# Patient Record
Sex: FEMALE | Race: BLACK OR AFRICAN AMERICAN | NOT HISPANIC OR LATINO | Employment: FULL TIME | URBAN - METROPOLITAN AREA
[De-identification: names, ages, dates, MRNs, and addresses within clinical notes are randomized per-mention and may not be internally consistent; named-entity substitution may affect disease eponyms.]

---

## 2017-03-30 ENCOUNTER — HOSPITAL ENCOUNTER (EMERGENCY)
Facility: OTHER | Age: 62
Discharge: HOME | End: 2017-03-30
Attending: INTERNAL MEDICINE
Payer: MEDICAID

## 2017-03-30 VITALS
OXYGEN SATURATION: 98 % | WEIGHT: 120 LBS | HEART RATE: 76 BPM | RESPIRATION RATE: 18 BRPM | BODY MASS INDEX: 24.19 KG/M2 | HEIGHT: 59 IN | SYSTOLIC BLOOD PRESSURE: 122 MMHG | TEMPERATURE: 98 F | DIASTOLIC BLOOD PRESSURE: 81 MMHG

## 2017-03-30 DIAGNOSIS — R07.9 CHEST PAIN: ICD-10-CM

## 2017-03-30 DIAGNOSIS — R09.1 PLEURISY: Primary | ICD-10-CM

## 2017-03-30 LAB
ALBUMIN SERPL-MCNC: 3.7 G/DL (ref 3.3–5.5)
ALP SERPL-CCNC: 70 U/L (ref 42–141)
BILIRUB SERPL-MCNC: 0.5 MG/DL (ref 0.2–1.6)
BUN SERPL-MCNC: 21 MG/DL (ref 7–22)
CALCIUM SERPL-MCNC: 9 MG/DL (ref 8–10.3)
CHLORIDE SERPL-SCNC: 103 MMOL/L (ref 98–108)
CREAT SERPL-MCNC: 0.6 MG/DL (ref 0.6–1.2)
GLUCOSE SERPL-MCNC: 140 MG/DL (ref 73–118)
POC ALT (SGPT): 29 U/L (ref 10–47)
POC AST (SGOT): 33 U/L (ref 11–38)
POC B-TYPE NATRIURETIC PEPTIDE: 13.4 PG/ML (ref 0–100)
POC CARDIAC TROPONIN I: 0 NG/ML
POC TCO2: 24 MMOL/L (ref 18–33)
POTASSIUM BLD-SCNC: 4 MMOL/L (ref 3.6–5.1)
PROTEIN, POC: 7.2 G/DL (ref 6.4–8.1)
SAMPLE: NORMAL
SODIUM BLD-SCNC: 139 MMOL/L (ref 128–145)

## 2017-03-30 PROCEDURE — 99284 EMERGENCY DEPT VISIT MOD MDM: CPT

## 2017-03-30 PROCEDURE — 25000003 PHARM REV CODE 250: Performed by: INTERNAL MEDICINE

## 2017-03-30 RX ORDER — IBUPROFEN 800 MG/1
800 TABLET ORAL EVERY 8 HOURS PRN
Qty: 20 TABLET | Refills: 0 | Status: SHIPPED | OUTPATIENT
Start: 2017-03-30 | End: 2017-11-04 | Stop reason: SDUPTHER

## 2017-03-30 RX ORDER — IBUPROFEN 400 MG/1
800 TABLET ORAL
Status: COMPLETED | OUTPATIENT
Start: 2017-03-30 | End: 2017-03-30

## 2017-03-30 RX ORDER — NAPROXEN SODIUM 220 MG/1
162 TABLET, FILM COATED ORAL
Status: DISCONTINUED | OUTPATIENT
Start: 2017-03-30 | End: 2017-03-30 | Stop reason: HOSPADM

## 2017-03-30 RX ADMIN — IBUPROFEN 800 MG: 400 TABLET, FILM COATED ORAL at 09:03

## 2017-03-30 NOTE — DISCHARGE INSTRUCTIONS
Pleurisy  You have pain in your chest. Your healthcare provider has told you that you have pleurisy, or pleuritis. Pleurisy is swelling (inflammation) of the pleura. The pleura are two layers of thin smooth tissue that surround the lungs and line the chest.  What are the symptoms of pleurisy?     Pleurisy is inflammation of the pleura. The pleura cover the lungs and line the chest.   Pleurisy usually causes sharp chest pain. It is usually worse when you take a deep breath, cough, or sneeze.  What causes pleurisy?  Many things can cause pleurisy. A common cause is a viral infection like the flu or pneumonia. Serious lung problems that can cause it include:  · A blood clot in the lung (pulmonary embolism)  · Air between the pleura (pneumothorax)  Serious heart problems that can cause pleurisy include:  · Heart attack  · Inflammation of the covering of the heart (pericarditis)  How is pleurisy diagnosed?  Your healthcare provider examines you and asks you about your symptoms and health history. He or she will first check you for the serious causes of chest pain. You may have:  · Lab tests  · Imaging tests such as chest X-ray, CT scan, or ultrasound  · EKG  How is pleurisy treated?  Treatment depends on what is causing the pleurisy. Serious conditions are treated in the hospital. You may need medicines to decrease the inflammation and pain.     Call 911  Call 911 if any of these occur:  · Trouble breathing  · Chest pain that gets worse  Call your healthcare provider  Call your healthcare provider right away if you have:  · A fever of 100.4°F (38°C) or higher, or as directed by your healthcare provider   Date Last Reviewed: 11/1/2016  © 7288-2784 [employer]. [address] [place] [address], [place] [address]. All rights reserved. This information is not intended as a substitute for professional medical care. Always follow your healthcare professional's instructions.

## 2017-03-30 NOTE — ED AVS SNAPSHOT
[facility] EMERGENCY DEPARTMENT  [address]  [place] [address]               Baljinder Rausch   3/30/2017  8:16 AM   ED    Description:  Female : 1955   Department:  [facility] Emergency Department           Your Care was Coordinated By:     Provider Role From To    Shaheen Bradshaw MD Attending Provider 17 0856 --      Reason for Visit     Chest Pain           Diagnoses this Visit        Comments    Pleurisy    -  Primary     Chest pain           ED Disposition     ED Disposition Condition Comment    Discharge             To Do List           Follow-up Information     Follow up with Primary Doctor No. Schedule an appointment as soon as possible for a visit in 1 day(s).       These Medications        Disp Refills Start End    ibuprofen (ADVIL,MOTRIN) 800 MG tablet 20 tablet 0 3/30/2017     Take 1 tablet (800 mg total) by mouth every 8 (eight) hours as needed for Pain. - Oral    Pharmacy: [facility] [id number]  KATHY MOYA [facility] #: [phone]         [facility]     [facility] Care [facility] -  Assistance  Unless otherwise directed by your provider, please contact [facility], our nurse care line that is available for  assistance.     Registered nurses in the [facility] provide: appointment scheduling, clinical advisement, health education, and other advisory services.  Call: [phone] (toll free)               Medications           Message regarding Medications     Verify the changes and/or additions to your medication regime listed below are the same as discussed with your clinician today.  If any of these changes or additions are incorrect, please notify your healthcare provider.        START taking these NEW medications        Refills    ibuprofen (ADVIL,MOTRIN) 800 MG tablet 0    Sig: Take 1 tablet (800 mg total) by mouth every 8 (eight) hours as needed for Pain.    Class: Normal     "Route: Oral      These medications were administered today        Dose Freq    aspirin chewable tablet 162 mg 162 mg ED 1 Time    Sig: Take 2 tablets (162 mg total) by mouth ED 1 Time.    Class: Normal    Route: Oral    ibuprofen tablet 800 mg 800 mg ED 1 Time    Sig: Take 2 tablets (800 mg total) by mouth ED 1 Time.    Class: Normal    Route: Oral           Verify that the below list of medications is an accurate representation of the medications you are currently taking.  If none reported, the list may be blank. If incorrect, please contact your healthcare provider. Carry this list with you in case of emergency.           Current Medications     aspirin chewable tablet 162 mg Take 2 tablets (162 mg total) by mouth ED 1 Time.    ibuprofen (ADVIL,MOTRIN) 800 MG tablet Take 1 tablet (800 mg total) by mouth every 8 (eight) hours as needed for Pain.    ibuprofen tablet 800 mg Take 2 tablets (800 mg total) by mouth ED 1 Time.           Clinical Reference Information           Your Vitals Were     BP Pulse Temp Resp Height Weight    122/81 76 97.8 °F (36.6 °C) (Temporal) 18 4' 11" (1.499 m) 54.4 kg (120 lb)    SpO2 BMI             98% 24.24 kg/m2         Allergies as of 3/30/2017        Reactions    Pcn [Penicillins]       Immunizations Administered on Date of Encounter - 3/30/2017     None      ED Micro, Lab, POCT     Start Ordered       Status Ordering Provider    03/30/17 0854 03/30/17 0854  POCT B-type natriuretic peptide (BNP)  Once      Final result     03/30/17 0842 03/30/17 0842  Troponin ISTAT  Once      Final result     03/30/17 0837 03/30/17 0837  POCT CMP  Once      Final result     03/30/17 0832 03/30/17 0832  POCT CBC  Once      Final result     03/30/17 0832 03/30/17 0832  POCT CMP  Once      Acknowledged     03/30/17 0832 03/30/17 0832  POCT Troponin  Once      Acknowledged     03/30/17 0832 03/30/17 0832  POCT B-type natriuretic peptide (BNP)  Once      Acknowledged       ED Imaging Orders     Start " Ordered       Status Ordering Provider    03/30/17 0832 03/30/17 0832  X-Ray Chest PA And Lateral  1 time imaging      Final result         Discharge Instructions           Pleurisy  You have pain in your chest. Your healthcare provider has told you that you have pleurisy, or pleuritis. Pleurisy is swelling (inflammation) of the pleura. The pleura are two layers of thin smooth tissue that surround the lungs and line the chest.  What are the symptoms of pleurisy?     Pleurisy is inflammation of the pleura. The pleura cover the lungs and line the chest.   Pleurisy usually causes sharp chest pain. It is usually worse when you take a deep breath, cough, or sneeze.  What causes pleurisy?  Many things can cause pleurisy. A common cause is a viral infection like the flu or pneumonia. Serious lung problems that can cause it include:  · A blood clot in the lung (pulmonary embolism)  · Air between the pleura (pneumothorax)  Serious heart problems that can cause pleurisy include:  · Heart attack  · Inflammation of the covering of the heart (pericarditis)  How is pleurisy diagnosed?  Your healthcare provider examines you and asks you about your symptoms and health history. He or she will first check you for the serious causes of chest pain. You may have:  · Lab tests  · Imaging tests such as chest X-ray, CT scan, or ultrasound  · EKG  How is pleurisy treated?  Treatment depends on what is causing the pleurisy. Serious conditions are treated in the hospital. You may need medicines to decrease the inflammation and pain.     Call 911  Call 911 if any of these occur:  · Trouble breathing  · Chest pain that gets worse  Call your healthcare provider  Call your healthcare provider right away if you have:  · A fever of 100.4°F (38°C) or higher, or as directed by your healthcare provider   Date Last Reviewed: 11/1/2016  © 4181-0437 [employer]. [address] [place] [address], [place] [address]. All rights reserved. This  information is not intended as a substitute for professional medical care. Always follow your healthcare professional's instructions.          [facility] Sign-Up     Activating your [facility] account is as easy as 1-2-3!     1) Visit [email or web address], select Sign Up Now, enter this activation code and your date of birth, then select Next.  [email or web address]  Expires: 5/14/2017  9:45 AM      2) Create a username and password to use when you visit [facility] in the future and select a security question in case you lose your password and select Next.    3) Enter your e-mail address and click Sign Up!    Additional Information  If you have questions, please e-mail [email or web address] or call [phone] to talk to our [facility] staff. Remember, [facility] is NOT to be used for urgent needs. For medical emergencies, dial 911.          [facility] Emergency Department complies with applicable Federal civil rights laws and does not discriminate on the basis of race, color, national origin, age, disability, or sex.        Language Assistance Services     ATTENTION: Language assistance services are available, free of charge. Please call [phone].      ATENCIÓN: Si habla español, tiene a oliver disposición servicios gratuitos de asistencia lingüística. Llame al [phone].     CHÚ Ý: N?u b?n nói Ti?ng Vi?t, có các d?ch v? h? tr? ngôn ng? mi?n phí dành cho b?n. G?i s? [phone].

## 2017-03-30 NOTE — ED PROVIDER NOTES
Encounter Date: 3/30/2017       History     Chief Complaint   Patient presents with    Chest Pain     Pt here with c/o midsternal CP that began this morning along with SOB     Review of patient's allergies indicates:   Allergen Reactions    Pcn [penicillins]      Patient is a 61 y.o. female presenting with the following complaint: chest pain. The history is provided by the patient. No  was used.   Chest Pain   Illness onset: approx 6 hours ago. Chest pain occurs constantly. The chest pain is unchanged. The pain is associated with breathing. At its most intense, the chest pain is at 8/10. The quality of the pain is described as pleuritic and sharp. The pain does not radiate. Chest pain is worsened by deep breathing. Pertinent negatives for primary symptoms include no fever, no fatigue, no syncope, no shortness of breath, no cough, no wheezing, no palpitations, no abdominal pain, no nausea, no vomiting, no dizziness and no altered mental status. She tried nothing for the symptoms.     Past Medical History:   Diagnosis Date    Sciatica      History reviewed. No pertinent surgical history.  History reviewed. No pertinent family history.  Social History   Substance Use Topics    Smoking status: Never Smoker    Smokeless tobacco: None    Alcohol use No     Review of Systems   Constitutional: Negative.  Negative for fatigue and fever.   HENT: Negative.    Eyes: Negative.    Respiratory: Negative for cough, shortness of breath, wheezing and stridor.         Pleuritic pain   Cardiovascular: Positive for chest pain. Negative for palpitations and syncope.   Gastrointestinal: Negative.  Negative for abdominal pain, nausea and vomiting.   Endocrine: Negative.    Musculoskeletal: Negative.    Skin: Negative.    Neurological: Negative for dizziness.   Hematological: Negative.        Physical Exam   Initial Vitals   BP Pulse Resp Temp SpO2   03/30/17 0822 03/30/17 0822 03/30/17 0823 03/30/17 0823 03/30/17  0820   131/106 79 20 97.8 °F (36.6 °C) 98 %     Physical Exam    Nursing note and vitals reviewed.  Constitutional: She appears well-developed and well-nourished.   HENT:   Head: Normocephalic.   Eyes: EOM are normal.   Neck: Normal range of motion.   Cardiovascular: Normal rate and regular rhythm.   Pulmonary/Chest: Breath sounds normal. No respiratory distress. She has no wheezes. She has no rhonchi. She has no rales. She exhibits no tenderness.   Abdominal: Soft. Bowel sounds are normal.   Musculoskeletal: Normal range of motion.   Neurological: She is alert and oriented to person, place, and time.   Skin: Skin is warm and dry.         ED Course   Procedures  Labs Reviewed   TROPONIN ISTAT   POCT CBC   POCT B-TYPE NATRIURETIC PEPTIDE (BNP)   POCT CMP   POCT CMP   POCT TROPONIN   POCT B-TYPE NATRIURETIC PEPTIDE (BNP)     EKG Readings: (Independently Interpreted)   Initial Reading: No STEMI. Rhythm: Normal Sinus Rhythm. Ectopy: No Ectopy. Conduction: Normal. ST Segments: Normal ST Segments. T Waves: Normal. Clinical Impression: Normal Sinus Rhythm                            ED Course     Labs Reviewed  Admission on 03/30/2017   Component Date Value Ref Range Status    POC Cardiac Troponin I 03/30/2017 0.00  <0.09 ng/mL Final    Sample 03/30/2017 UNK   Final    POC B-Type Natriuretic Peptide 03/30/2017 13.4  0.0 - 100 pg/mL Final    Albumin, POC 03/30/2017 3.7  3.3 - 5.5 g/dL Final    Alkaline Phosphatase, POC 03/30/2017 70  42 - 141 U/L Final    ALT (SGPT), POC 03/30/2017 29  10.0 - 47.0 U/L Final    AST (SGOT), POC 03/30/2017 33  11.0 - 38 U/L Final    POC BUN 03/30/2017 21  7.0 - 22.0 mg/dL Final    Calcium, POC 03/30/2017 9.0  8.0 - 10.3 mg/dL Final    POC Chloride 03/30/2017 103  98 - 108 mmol/L Final    POC Creatinine 03/30/2017 0.6  0.6 - 1.2 mg/dL Final    POC Glucose 03/30/2017 140  73 - 118 mg/dL Final    POC Potassium 03/30/2017 4.0  3.6 - 5.1 mmol/L Final    POC Sodium 03/30/2017 139  128 -  145 mmol/L Final    Bilirubin 03/30/2017 0.5  0.2 - 1.6 mg/dL Final    POC TCO2 03/30/2017 24  18 - 33 mmol/L Final    Protein 03/30/2017 7.2  6.4 - 8.1 g/dL Final        Imaging Reviewed    Imaging Results         X-Ray Chest PA And Lateral (Final result) Result time:  03/30/17 08:57:16    Final result by Interface, Rad Results In (03/30/17 08:57:16)    Narrative:    Study Desc:   XR CHEST PA AND LATERAL  Clinical History: Chest pain and shortness of breath     COMPARISON: None     FINDINGS:     The PA and lateral chest radiographs demonstrated increased lung volumes without   interstitial or airspace opacities, pleural effusions or pneumothorax.     The heart size and pulmonary vasculature are normal. The trachea is midline. There are no   clinically significant osseous abnormalities noted.     IMPRESSION:  No chest radiographic evidence of acute cardiopulmonary disease.     SL: 24  Signed by: Mauro Snider MD  2017-03-30 08:57:18              Medications given in ED    Medications   aspirin chewable tablet 162 mg (162 mg Oral Not Given 3/30/17 0845)   ibuprofen tablet 800 mg (not administered)       Discharge Medications               Patient discharged to home in stable condition with instructions to:   1. Please take all meds as prescribed.  2. Follow-up with your primary care doctor   3. Return precautions discussed and patient and/or family/caretaker understands to return to the emergency room for any concerns including worsening of your current symptoms, fever, chills, night sweats, worsening pain, chest pain, shortness of breath, nausea, vomiting, diarrhea, bleeding, headache, difficulty talking, visual disturbances, weakness, numbness or any other acute concerns      Clinical Impression:   Pleurisy  Disposition:   Disposition: Discharged  Condition: Stable       Shaheen Bradshaw MD  03/30/17 0942

## 2017-11-04 ENCOUNTER — HOSPITAL ENCOUNTER (EMERGENCY)
Facility: OTHER | Age: 62
Discharge: HOME | End: 2017-11-04
Attending: INTERNAL MEDICINE
Payer: MEDICAID

## 2017-11-04 VITALS
TEMPERATURE: 98 F | RESPIRATION RATE: 16 BRPM | OXYGEN SATURATION: 99 % | HEART RATE: 66 BPM | BODY MASS INDEX: 25.2 KG/M2 | HEIGHT: 59 IN | WEIGHT: 125 LBS | DIASTOLIC BLOOD PRESSURE: 74 MMHG | SYSTOLIC BLOOD PRESSURE: 113 MMHG

## 2017-11-04 DIAGNOSIS — S39.012A STRAIN OF LUMBAR REGION, INITIAL ENCOUNTER: Primary | ICD-10-CM

## 2017-11-04 PROCEDURE — 99283 EMERGENCY DEPT VISIT LOW MDM: CPT

## 2017-11-04 RX ORDER — METHOCARBAMOL 750 MG/1
1500 TABLET, FILM COATED ORAL 3 TIMES DAILY
Qty: 30 TABLET | Refills: 0 | Status: SHIPPED | OUTPATIENT
Start: 2017-11-04 | End: 2017-11-09

## 2017-11-04 RX ORDER — IBUPROFEN 800 MG/1
800 TABLET ORAL EVERY 8 HOURS PRN
Qty: 30 TABLET | Refills: 0 | OUTPATIENT
Start: 2017-11-04 | End: 2019-09-06
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
## 2019-09-06 ENCOUNTER — HOSPITAL ENCOUNTER (EMERGENCY)
Facility: HOSPITAL | Age: 64
Discharge: HOME | End: 2019-09-06
Attending: INTERNAL MEDICINE
Payer: MEDICAID

## 2019-09-06 VITALS
RESPIRATION RATE: 18 BRPM | TEMPERATURE: 98 F | DIASTOLIC BLOOD PRESSURE: 74 MMHG | BODY MASS INDEX: 25.82 KG/M2 | OXYGEN SATURATION: 100 % | SYSTOLIC BLOOD PRESSURE: 133 MMHG | WEIGHT: 123 LBS | HEART RATE: 76 BPM | HEIGHT: 58 IN

## 2019-09-06 DIAGNOSIS — R13.10 ODYNOPHAGIA: Primary | ICD-10-CM

## 2019-09-06 PROCEDURE — 99284 EMERGENCY DEPT VISIT MOD MDM: CPT | Mod: 25,ER

## 2019-09-06 RX ORDER — IBUPROFEN 800 MG/1
800 TABLET ORAL EVERY 8 HOURS PRN
Qty: 30 TABLET | Refills: 0 | Status: SHIPPED | OUTPATIENT
Start: 2019-09-06

## 2019-09-07 NOTE — ED PROVIDER NOTES
"Encounter Date: 9/6/2019    SCRIBE #1 NOTE: I, Arelis Montanez, am scribing for, and in the presence of,  Dr. Bradshaw. I have scribed the following portions of the note - Other sections scribed: HPI, ROS, PE.       History     Chief Complaint   Patient presents with    Foreign Body In Throat     Patient stated she ate blue crabs on wednesday and complaining she has a piece "stuck" in throat     This is a 63 year old female presenting to ED feeling as if there is a crab shell in her throat x 2 days. Patient reports eating gumbo 2 days ago, which is when she suspects it happened. She denies SOB or trouble swallowing at the current time.     The history is provided by the patient. No  was used.     Review of patient's allergies indicates:   Allergen Reactions    Pcn [penicillins]      Past Medical History:   Diagnosis Date    Sciatica      No past surgical history on file.  No family history on file.  Social History     Tobacco Use    Smoking status: Never Smoker   Substance Use Topics    Alcohol use: No    Drug use: No     Review of Systems   Constitutional: Negative for fever.   HENT:        Foreign body sensation in throat   Respiratory: Negative for shortness of breath.    Musculoskeletal: Negative for neck pain and neck stiffness.   All other systems reviewed and are negative.      Physical Exam     Initial Vitals [09/06/19 1925]   BP Pulse Resp Temp SpO2   137/83 73 20 98.5 °F (36.9 °C) 100 %      MAP       --         Physical Exam    Nursing note and vitals reviewed.  Constitutional: She appears well-developed and well-nourished.   HENT:   Head: Normocephalic and atraumatic.   Right Ear: External ear normal.   Left Ear: External ear normal.   Mouth/Throat: Oropharynx is clear and moist and mucous membranes are normal.   Enlarged nasal turbinates    Eyes: Conjunctivae are normal.   Neck: Neck supple.   Cardiovascular: Normal rate and regular rhythm. Exam reveals no gallop and no " friction rub.    No murmur heard.  Pulmonary/Chest: Breath sounds normal. No respiratory distress.   Musculoskeletal: Normal range of motion.   Neurological: She is alert and oriented to person, place, and time.   Skin: Skin is warm and dry.   Psychiatric: She has a normal mood and affect.         ED Course   Procedures  Labs Reviewed - No data to display       Imaging Results          CT Soft Tissue Neck WO Contrast (Final result)  Result time 09/06/19 20:58:50    Final result by Cedrick Ayers MD (09/06/19 20:58:50)                 Impression:      No evidence of radiopaque foreign body or inflammatory changes in the neck, allowing for significant motion limitations.  Clinical correlation and direct visualization, as clinically warranted.      Electronically signed by: Cedrick Ayers MD  Date:    09/06/2019  Time:    20:58             Narrative:    EXAMINATION:  CT SOFT TISSUE NECK WITHOUT CONTRAST    CLINICAL HISTORY:  Sore throat/stridor, epiglottis or tonsillitis suspected;Rule out foreign body;    TECHNIQUE:  Low dose axial images, sagittal and coronal reformations were performed from the skull base to the level of the clavicles.  Contrast was not administered.    COMPARISON:  No comparison is available.    FINDINGS:  The visualized intracranial compartment is within normal limits.  The orbits and intraorbital contents are within normal limits.  The nasal cavity is within normal limits.    There is significant dental hardware obscuring evaluation of the oral cavity, nasopharynx and oropharynx.  There is also significant motion limitations to the examination.  No radiopaque foreign body is identified.  No inflammatory changes identified in the neck, allowing for motion limitations.    The trachea is unremarkable.  There are fibrotic changes in the visualized lung apices.  There is no evidence of a pneumothorax.  No discrete pulmonary nodules identified.    There are degenerative changes in the osseous structures.   There is no evidence of a fracture.                                 Medical Decision Making:   History:   Old Medical Records: I decided to obtain old medical records.  Initial Assessment:   This is a 63 year old female presenting to ED feeling as if there is a crab shell in her throat x 2 days. Patient reports eating gumbo 2 days ago, which is when she suspects it happened. She denies SOB or trouble swallowing at the current time.   Clinical Tests:   Radiological Study: Ordered and Reviewed  ED Management:  CT of the soft tissues neck reveals no abnormality.  Patient was given instructions for odynophagia and received a prescription for ibuprofen.  She was advised to follow up with her primary care physician within the next 4 days for re-evaluation and to return to the emergency department if condition worsens.            Scribe Attestation:   Scribe #1: I performed the above scribed service and the documentation accurately describes the services I performed. I attest to the accuracy of the note.    This document was produced by a scribe under my direction and in my presence. I agree with the content of the note and have made any necessary edits.     Dr. Bradshaw    09/06/2019 9:21 PM             Clinical Impression:     1. Odynophagia            Disposition:   Disposition: Discharged  Condition: Stable                        Shaheen Bradshaw MD  09/06/19 6190

## 2019-09-07 NOTE — ED TRIAGE NOTES
Pt presents to ER with c/o eating crabs a couple days ago and feels like something is still stuck in her throat from it.  She is swallowing without difficulty and no swelling to throat noted.

## 2021-03-09 ENCOUNTER — HOSPITAL ENCOUNTER (EMERGENCY)
Facility: HOSPITAL | Age: 66
Discharge: HOME | End: 2021-03-09
Attending: INTERNAL MEDICINE
Payer: MEDICAID

## 2021-03-09 VITALS
TEMPERATURE: 99 F | WEIGHT: 131.81 LBS | BODY MASS INDEX: 26.57 KG/M2 | RESPIRATION RATE: 18 BRPM | HEART RATE: 74 BPM | OXYGEN SATURATION: 99 % | HEIGHT: 59 IN | DIASTOLIC BLOOD PRESSURE: 74 MMHG | SYSTOLIC BLOOD PRESSURE: 121 MMHG

## 2021-03-09 DIAGNOSIS — J02.9 PHARYNGITIS, UNSPECIFIED ETIOLOGY: Primary | ICD-10-CM

## 2021-03-09 DIAGNOSIS — T18.9XXA FOREIGN BODY IN DIGESTIVE SYSTEM: ICD-10-CM

## 2021-03-09 PROCEDURE — 99283 EMERGENCY DEPT VISIT LOW MDM: CPT | Mod: 25,ER

## 2024-05-20 ENCOUNTER — OFFICE VISIT (OUTPATIENT)
Dept: URGENT CARE | Facility: CLINIC | Age: 69
End: 2024-05-20
Payer: MEDICARE

## 2024-05-20 VITALS
HEART RATE: 87 BPM | OXYGEN SATURATION: 95 % | HEIGHT: 59 IN | SYSTOLIC BLOOD PRESSURE: 123 MMHG | RESPIRATION RATE: 18 BRPM | TEMPERATURE: 99 F | BODY MASS INDEX: 26.57 KG/M2 | DIASTOLIC BLOOD PRESSURE: 83 MMHG | WEIGHT: 131.81 LBS

## 2024-05-20 DIAGNOSIS — T18.9XXA SWALLOWED FOREIGN BODY, INITIAL ENCOUNTER: Primary | ICD-10-CM

## 2024-05-20 PROCEDURE — 70360 X-RAY EXAM OF NECK: CPT | Mod: S$GLB,,, | Performed by: RADIOLOGY

## 2024-05-20 PROCEDURE — 99203 OFFICE O/P NEW LOW 30 MIN: CPT | Mod: S$GLB,,, | Performed by: NURSE PRACTITIONER

## 2024-05-20 PROCEDURE — 74018 RADEX ABDOMEN 1 VIEW: CPT | Mod: S$GLB,,, | Performed by: RADIOLOGY

## 2024-05-20 RX ORDER — LATANOPROST 50 UG/ML
1 SOLUTION/ DROPS OPHTHALMIC NIGHTLY
COMMUNITY
Start: 2024-02-28

## 2024-05-20 NOTE — PROGRESS NOTES
"Subjective:      Patient ID: Lizeth Rausch is a 68 y.o. female.    Vitals:  height is 4' 11" (1.499 m) and weight is 59.8 kg (131 lb 13.4 oz). Her oral temperature is 99 °F (37.2 °C). Her blood pressure is 123/83 and her pulse is 87. Her respiration is 18 and oxygen saturation is 95%.     Chief Complaint: Swallowed Foreign Body    68-year-old female presents to clinic for evaluation of possible swallowed foreign body.  Patient states that she was eating an ice cream cone yesterday, and noticed that a tooth was missing from her dentures.  She denies any pain.  She reports bowel movement this morning.  Denies blood in stool.  She is awake and alert, behavior appropriate to situation, no acute distress noted on today's visit.    Swallowed Foreign Body  The incident occurred 12 to 24 hours ago. Suspected object: a tooth. The foreign body is suspected to be swallowed. The incident was suspected. The incident was witnessed/reported by The patient. Risk factors include that an object was missing. Associated symptoms include a sore throat. Pertinent negatives include no abdominal pain, fever, trouble swallowing or vomiting. Associated symptoms comments: Chest discomfort.       Constitution: Negative for activity change, appetite change, chills, sweating, fatigue and fever.   HENT:  Positive for sore throat. Negative for trouble swallowing.    Gastrointestinal:  Negative for abdominal pain, nausea, vomiting and rectal bleeding.   Neurological:  Negative for dizziness.      Objective:     Physical Exam   Constitutional: She is oriented to person, place, and time. She appears well-developed.  Non-toxic appearance. She does not appear ill.   HENT:   Head: Normocephalic and atraumatic. Head is without abrasion, without contusion and without laceration.   Ears:   Right Ear: External ear normal.   Left Ear: External ear normal.   Nose: Nose normal.   Mouth/Throat: Oropharynx is clear and moist and mucous membranes are normal. "   Eyes: Conjunctivae, EOM and lids are normal.   Neck: Trachea normal and phonation normal.   Cardiovascular: Normal rate.   Pulmonary/Chest: Effort normal. No respiratory distress.   Abdominal: Normal appearance.   Musculoskeletal: Normal range of motion.         General: Normal range of motion.   Neurological: She is alert and oriented to person, place, and time.   Skin: Skin is dry, intact and not pale. No abrasion, No burn and No ecchymosis   Psychiatric: Her speech is normal and behavior is normal. Judgment and thought content normal.   Nursing note and vitals reviewed.    XR NECK SOFT TISSUE    Result Date: 5/20/2024  EXAMINATION: XR NECK SOFT TISSUE CLINICAL HISTORY: Foreign body of alimentary tract, part unspecified, initial encounter. TECHNIQUE: AP and lateral soft tissue views the neck were performed. COMPARISON: 03/09/2021. FINDINGS: Degenerative changes in the cervical spine as seen previously.  Prevertebral soft tissues are unremarkable.  Epiglottis is within normal limits.  Several absent teeth noted, and the patient's previously seen dental appliance is no longer present.  No unexpected metallic foreign body identified in the field of view.  Correlation with type of foreign body recommended as certain foreign bodies may be radiographically occult.     See findings above. Electronically signed by: Augustus Nobles MD Date:    05/20/2024 Time:    17:39    X-Ray Abdomen AP 1 View    Result Date: 5/20/2024  EXAMINATION: XR ABDOMEN AP 1 VIEW CLINICAL HISTORY: Foreign body of alimentary tract, part unspecified, initial encounter TECHNIQUE: Single AP View of the abdomen was performed. COMPARISON: None. FINDINGS: Bowel gas pattern is nonobstructive.  Mild stool burden in the colon.  Pelvic phleboliths noted.  No unexpected metallic foreign body.  Recommend correlation with type of ingested foreign body.  Certain foreign bodies could be radiographically occult.     See above. Electronically signed by: Augustus  MD Giuliano Date:    05/20/2024 Time:    17:21       Assessment:     1. Swallowed foreign body, initial encounter        Plan:       Swallowed foreign body, initial encounter  -     X-Ray Abdomen AP 1 View; Future; Expected date: 05/20/2024  -     XR NECK SOFT TISSUE; Future; Expected date: 05/20/2024      - reviewed x-ray results, no foreign body visualized on x-ray.  Follow-up with PCP.  ER precautions given.  Patient verbalized understanding and is in agreement with plan.    Patient Instructions   - Follow up with your PCP or specialty clinic as directed in the next 1-2 weeks if not improved or as needed.  You can call [phone] to schedule an appointment with the appropriate provider.    - Go to the ER or seek medical attention immediately if you develop new or worsening symptoms.    - You must understand that you have received an Urgent Care treatment only and that you may be released before all of your medical problems are known or treated.   - You, the patient, will arrange for follow up care as instructed.   - If your condition worsens or fails to improve we recommend that you receive another evaluation at the ER immediately or contact your PCP to discuss your concerns or return here.

## 2024-05-20 NOTE — PATIENT INSTRUCTIONS
- Follow up with your PCP or specialty clinic as directed in the next 1-2 weeks if not improved or as needed.  You can call [phone] to schedule an appointment with the appropriate provider.    - Go to the ER or seek medical attention immediately if you develop new or worsening symptoms.    - You must understand that you have received an Urgent Care treatment only and that you may be released before all of your medical problems are known or treated.   - You, the patient, will arrange for follow up care as instructed.   - If your condition worsens or fails to improve we recommend that you receive another evaluation at the ER immediately or contact your PCP to discuss your concerns or return here.

## 2025-08-15 ENCOUNTER — OFFICE VISIT (OUTPATIENT)
Dept: URGENT CARE | Facility: CLINIC | Age: 70
End: 2025-08-15
Payer: MEDICARE

## 2025-08-15 VITALS
DIASTOLIC BLOOD PRESSURE: 84 MMHG | TEMPERATURE: 98 F | SYSTOLIC BLOOD PRESSURE: 151 MMHG | RESPIRATION RATE: 20 BRPM | OXYGEN SATURATION: 98 % | WEIGHT: 131 LBS | HEART RATE: 88 BPM | BODY MASS INDEX: 26.41 KG/M2 | HEIGHT: 59 IN

## 2025-08-15 DIAGNOSIS — R07.9 CHEST PAIN, UNSPECIFIED TYPE: ICD-10-CM

## 2025-08-15 DIAGNOSIS — R07.89 FEELING OF CHEST TIGHTNESS: Primary | ICD-10-CM

## 2025-08-15 LAB
OHS QRS DURATION: 80 MS
OHS QTC CALCULATION: 414 MS

## 2025-08-15 PROCEDURE — 71046 X-RAY EXAM CHEST 2 VIEWS: CPT | Mod: S$GLB,,, | Performed by: RADIOLOGY

## 2025-08-15 RX ORDER — IBUPROFEN 600 MG/1
600 TABLET, FILM COATED ORAL EVERY 8 HOURS PRN
Qty: 20 TABLET | Refills: 0 | Status: SHIPPED | OUTPATIENT
Start: 2025-08-15

## 2025-08-15 RX ORDER — IPRATROPIUM BROMIDE 0.5 MG/2.5ML
0.5 SOLUTION RESPIRATORY (INHALATION)
Status: COMPLETED | OUTPATIENT
Start: 2025-08-15 | End: 2025-08-15

## 2025-08-15 RX ORDER — IBUPROFEN 600 MG/1
600 TABLET, FILM COATED ORAL EVERY 8 HOURS PRN
Qty: 20 TABLET | Refills: 0 | Status: SHIPPED | OUTPATIENT
Start: 2025-08-15 | End: 2025-08-15 | Stop reason: RX

## 2025-08-15 RX ORDER — ALBUTEROL SULFATE 90 UG/1
2 INHALANT RESPIRATORY (INHALATION) EVERY 6 HOURS PRN
Qty: 18 G | Refills: 0 | Status: SHIPPED | OUTPATIENT
Start: 2025-08-15

## 2025-08-15 RX ORDER — ALBUTEROL SULFATE 90 UG/1
2 INHALANT RESPIRATORY (INHALATION) EVERY 6 HOURS PRN
Qty: 18 G | Refills: 0 | Status: SHIPPED | OUTPATIENT
Start: 2025-08-15 | End: 2025-08-15

## 2025-08-15 RX ORDER — ALBUTEROL SULFATE 0.83 MG/ML
2.5 SOLUTION RESPIRATORY (INHALATION)
Status: COMPLETED | OUTPATIENT
Start: 2025-08-15 | End: 2025-08-15

## 2025-08-15 RX ADMIN — ALBUTEROL SULFATE 2.5 MG: 0.83 SOLUTION RESPIRATORY (INHALATION) at 09:08

## 2025-08-15 RX ADMIN — IPRATROPIUM BROMIDE 0.5 MG: 0.5 SOLUTION RESPIRATORY (INHALATION) at 09:08
